# Patient Record
Sex: MALE | Race: WHITE | NOT HISPANIC OR LATINO
[De-identification: names, ages, dates, MRNs, and addresses within clinical notes are randomized per-mention and may not be internally consistent; named-entity substitution may affect disease eponyms.]

---
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
## 2024-01-22 ENCOUNTER — APPOINTMENT (OUTPATIENT)
Dept: PEDIATRICS | Facility: CLINIC | Age: 19
End: 2024-01-22
Payer: COMMERCIAL

## 2024-01-22 VITALS
TEMPERATURE: 98 F | HEART RATE: 89 BPM | OXYGEN SATURATION: 98 % | BODY MASS INDEX: 21.85 KG/M2 | HEIGHT: 68 IN | WEIGHT: 144.13 LBS

## 2024-01-22 PROCEDURE — 99214 OFFICE O/P EST MOD 30 MIN: CPT

## 2024-01-24 ENCOUNTER — APPOINTMENT (OUTPATIENT)
Dept: ORTHOPEDIC SURGERY | Facility: CLINIC | Age: 19
End: 2024-01-24

## 2024-01-28 ENCOUNTER — RESULT CHARGE (OUTPATIENT)
Age: 19
End: 2024-01-28

## 2024-01-29 NOTE — HISTORY OF PRESENT ILLNESS
[de-identified] : sick [FreeTextEntry6] : sore throat, temporal HA, neon green congestion x1d tmax 100F took dayquil and nyquil +sick contacts - strep in friend who he has shared drinks with

## 2024-01-29 NOTE — DISCUSSION/SUMMARY
[FreeTextEntry1] : 18y/o M with sinusitis and exposure to strep.  Rapid strep negative.  - F/u throat culture - Sinus:  Recommend antibiotics, nasal saline, and guaifenesin. Side effect of treatment includes but not limited to diarrhea. Return if symptoms worsen or persist. - Return precautions reviewed. Patient to seek medical attention in ED if has decreased oral intake, decrease in wet diapers/voids, fever >100.4F, difficulty breathing, becomes lethargic, or has a change in mental status or alertness. To note if fever > 5 days must be seen immediately either in clinic or in ED. - Return/ED precautions given.  RTC routine and prn.  Caretaker expressed understanding and all questions answered.

## 2024-01-30 ENCOUNTER — APPOINTMENT (OUTPATIENT)
Dept: PEDIATRICS | Facility: CLINIC | Age: 19
End: 2024-01-30
Payer: COMMERCIAL

## 2024-01-30 VITALS
OXYGEN SATURATION: 98 % | BODY MASS INDEX: 21.82 KG/M2 | HEIGHT: 68 IN | WEIGHT: 144 LBS | TEMPERATURE: 98.8 F | HEART RATE: 98 BPM

## 2024-01-30 DIAGNOSIS — U07.1 COVID-19: ICD-10-CM

## 2024-01-30 DIAGNOSIS — B27.90 INFECTIOUS MONONUCLEOSIS, UNSPECIFIED W/OUT COMPLICATION: ICD-10-CM

## 2024-01-30 DIAGNOSIS — Z87.898 PERSONAL HISTORY OF OTHER SPECIFIED CONDITIONS: ICD-10-CM

## 2024-01-30 DIAGNOSIS — Z87.09 PERSONAL HISTORY OF OTHER DISEASES OF THE RESPIRATORY SYSTEM: ICD-10-CM

## 2024-01-30 DIAGNOSIS — H66.92 OTITIS MEDIA, UNSPECIFIED, LEFT EAR: ICD-10-CM

## 2024-01-30 DIAGNOSIS — Z86.19 PERSONAL HISTORY OF OTHER INFECTIOUS AND PARASITIC DISEASES: ICD-10-CM

## 2024-01-30 PROCEDURE — 99213 OFFICE O/P EST LOW 20 MIN: CPT

## 2024-01-30 RX ORDER — CEFDINIR 300 MG/1
300 CAPSULE ORAL
Qty: 14 | Refills: 0 | Status: COMPLETED | COMMUNITY
Start: 2024-01-30 | End: 2024-02-06

## 2024-01-30 RX ORDER — AMOXICILLIN AND CLAVULANATE POTASSIUM 500; 125 MG/1; MG/1
500-125 TABLET, FILM COATED ORAL EVERY 8 HOURS
Qty: 30 | Refills: 0 | Status: DISCONTINUED | COMMUNITY
Start: 2024-01-22 | End: 2024-01-30

## 2024-01-30 NOTE — PHYSICAL EXAM
[NL] : warm, clear [Clear] : right tympanic membrane clear [Erythema] : erythema [Bulging] : bulging

## 2024-01-30 NOTE — DISCUSSION/SUMMARY
[FreeTextEntry1] : PAM is a 19 year M with acute LEFT otitis media.   Otitis Media: Start antibiotics daily until complete. Continue supportive care. Return precautions reviewed. Patient to be brought to the ED if has persistent decreased oral intake, decrease in wet diapers, fever >100.4F or becomes patient becomes lethargic or changed in mental status and alertness. To note if fever > 5 days must be seen immediately either in clinic or in ED.   Caretaker expressed understanding of the plan and agrees. No other concerns or questions today.

## 2024-01-30 NOTE — HISTORY OF PRESENT ILLNESS
[EENT/Resp Symptoms] : EENT/RESPIRATORY SYMPTOMS [Ear Pain] : ear pain [___ Day(s)] : [unfilled] day(s) [Fever] : no fever [Eye Redness] : no eye redness [Eye Discharge] : no eye discharge [Sore Throat] : no sore throat [Decreased Appetite] : no decreased appetite [Vomiting] : no vomiting [Diarrhea] : no diarrhea [Decreased Urine Output] : no decreased urine output [Rash] : no rash [Headache] : no headache [FreeTextEntry9] : LEFT

## 2024-02-29 ENCOUNTER — APPOINTMENT (OUTPATIENT)
Dept: PEDIATRICS | Facility: CLINIC | Age: 19
End: 2024-02-29
Payer: COMMERCIAL

## 2024-02-29 VITALS
OXYGEN SATURATION: 97 % | BODY MASS INDEX: 20.2 KG/M2 | TEMPERATURE: 98.7 F | WEIGHT: 136.38 LBS | HEART RATE: 102 BPM | HEIGHT: 69 IN

## 2024-02-29 DIAGNOSIS — J10.1 INFLUENZA DUE TO OTHER IDENTIFIED INFLUENZA VIRUS WITH OTHER RESPIRATORY MANIFESTATIONS: ICD-10-CM

## 2024-02-29 DIAGNOSIS — R50.9 FEVER, UNSPECIFIED: ICD-10-CM

## 2024-02-29 PROCEDURE — 99213 OFFICE O/P EST LOW 20 MIN: CPT

## 2024-02-29 RX ORDER — FLUTICASONE PROPIONATE 50 UG/1
50 SPRAY, METERED NASAL
Qty: 1 | Refills: 0 | Status: DISCONTINUED | COMMUNITY
Start: 2021-10-07 | End: 2024-02-29

## 2024-02-29 RX ORDER — DEXMETHYLPHENIDATE HYDROCHLORIDE 5 MG/1
5 CAPSULE, EXTENDED RELEASE ORAL
Qty: 30 | Refills: 0 | Status: DISCONTINUED | COMMUNITY
Start: 2020-02-18 | End: 2024-02-29

## 2024-02-29 RX ORDER — CETIRIZINE HYDROCHLORIDE 10 MG/1
10 TABLET, FILM COATED ORAL DAILY
Qty: 7 | Refills: 0 | Status: DISCONTINUED | COMMUNITY
Start: 2020-02-18 | End: 2024-02-29

## 2024-02-29 NOTE — DISCUSSION/SUMMARY
[FreeTextEntry1] : PAM is a 19 year M with URI - Influenza B.  Flu: Recommend supportive care including antipyretics, fluids, OTC cough/cold medications if age-appropriate, and nasal saline followed by nasal suction. Discussed risks/benefits of Tamiflu. Patient to be brought to the ED if has persistent decreased oral intake, decrease in wet diapers, fever >100.4F or becomes patient becomes lethargic or changed in mental status and alertness. To note if fever > 5 days must be seen immediately either in clinic or in ED.  Caretaker expressed understanding of the plan and agrees. No other concerns or questions today.

## 2024-02-29 NOTE — HISTORY OF PRESENT ILLNESS
[EENT/Resp Symptoms] : EENT/RESPIRATORY SYMPTOMS [Fever] : fever [Runny nose] : runny nose [Nasal congestion] : nasal congestion [Cough] : cough [___ Week(s)] : [unfilled] week(s) [OTC Cough/Cold Preparations] : OTC cough/cold preparations [Ibuprofen] : ibuprofen [Decreased Appetite] : decreased appetite [Max Temp: ____] : Max temperature: [unfilled] [Eye Redness] : no eye redness [Eye Discharge] : no eye discharge [Diarrhea] : no diarrhea [Vomiting] : no vomiting [Decreased Urine Output] : no decreased urine output [Rash] : no rash

## 2024-02-29 NOTE — PHYSICAL EXAM
[NL] : warm, clear [Tired appearing] : tired appearing [Inflamed Nasal Mucosa] : inflamed nasal mucosa [FreeTextEntry4] : nasal congestion

## 2025-01-07 ENCOUNTER — NON-APPOINTMENT (OUTPATIENT)
Age: 20
End: 2025-01-07

## 2025-01-15 ENCOUNTER — APPOINTMENT (OUTPATIENT)
Dept: ORTHOPEDIC SURGERY | Facility: CLINIC | Age: 20
End: 2025-01-15

## 2025-02-03 ENCOUNTER — NON-APPOINTMENT (OUTPATIENT)
Age: 20
End: 2025-02-03

## 2025-02-13 ENCOUNTER — NON-APPOINTMENT (OUTPATIENT)
Age: 20
End: 2025-02-13

## 2025-05-12 ENCOUNTER — APPOINTMENT (OUTPATIENT)
Dept: NEUROLOGY | Facility: CLINIC | Age: 20
End: 2025-05-12